# Patient Record
Sex: MALE | Race: WHITE | NOT HISPANIC OR LATINO | URBAN - METROPOLITAN AREA
[De-identification: names, ages, dates, MRNs, and addresses within clinical notes are randomized per-mention and may not be internally consistent; named-entity substitution may affect disease eponyms.]

---

## 2019-02-06 ENCOUNTER — EMERGENCY (EMERGENCY)
Facility: HOSPITAL | Age: 49
LOS: 1 days | Discharge: HOME | End: 2019-02-06
Attending: EMERGENCY MEDICINE
Payer: SELF-PAY

## 2019-02-06 VITALS
WEIGHT: 179.9 LBS | RESPIRATION RATE: 18 BRPM | HEART RATE: 87 BPM | DIASTOLIC BLOOD PRESSURE: 85 MMHG | HEIGHT: 68 IN | TEMPERATURE: 97 F | SYSTOLIC BLOOD PRESSURE: 123 MMHG | OXYGEN SATURATION: 98 %

## 2019-02-06 PROCEDURE — 99285 EMERGENCY DEPT VISIT HI MDM: CPT

## 2019-02-06 PROCEDURE — 99284 EMERGENCY DEPT VISIT MOD MDM: CPT

## 2019-02-06 PROCEDURE — 82962 GLUCOSE BLOOD TEST: CPT

## 2019-02-06 NOTE — ED PROVIDER NOTE - OBJECTIVE STATEMENT
47 y/o M with no PMHx or PSHx presents to ED c/o brought in by [facility] due to being refused to board the plane because of ETOH intoxication. Pt was flying to [place]-->[facility]-->[place] ([place]). Pt reports having few drinks but otherwise no drug usage. Pt insist on returning to [facility] to get to his final destination. NKDA.

## 2019-02-06 NOTE — ED ADULT NURSE NOTE - CHPI ED NUR SYMPTOMS NEG
no decreased eating/drinking/no nausea/no dizziness/no weakness/no chills/no pain/no vomiting/no fever/no tingling

## 2020-09-20 NOTE — ED ADULT NURSE NOTE - INTEGUMENTARY WDL
Color consistent with ethnicity/race, warm, dry intact, resilient.
good, to achieve stated therapy goals

## 2021-06-24 NOTE — ED PROVIDER NOTE - PSYCHIATRIC, MLM
Patient arrives to ED with left eye redness and pain that began just prior to arrival. Patient denies hx of styes.   
Alert and oriented to person, place, time/situation. normal mood and affect. no apparent risk to self or others.

## 2022-09-21 NOTE — ED PROVIDER NOTE - MEDICAL DECISION MAKING DETAILS
ETOH intoxication.  Will observe until sober. Discharge. Ftsg Text: The defect edges were debeveled with a #15 scalpel blade.  Given the location of the defect, shape of the defect and the proximity to free margins a full thickness skin graft was deemed most appropriate.  Using a sterile surgical marker, the primary defect shape was transferred to the donor site. The area thus outlined was incised deep to adipose tissue with a #15 scalpel blade.  The harvested graft was then trimmed of adipose tissue until only dermis and epidermis was left.  The skin margins of the secondary defect were undermined to an appropriate distance in all directions utilizing iris scissors.  The secondary defect was closed with interrupted buried subcutaneous sutures.  The skin edges were then re-apposed with running  sutures.  The skin graft was then placed in the primary defect and oriented appropriately.